# Patient Record
Sex: MALE | Race: BLACK OR AFRICAN AMERICAN | Employment: FULL TIME | URBAN - METROPOLITAN AREA
[De-identification: names, ages, dates, MRNs, and addresses within clinical notes are randomized per-mention and may not be internally consistent; named-entity substitution may affect disease eponyms.]

---

## 2020-08-26 ENCOUNTER — HOSPITAL ENCOUNTER (EMERGENCY)
Age: 25
Discharge: HOME | End: 2020-08-26
Attending: EMERGENCY MEDICINE
Payer: OTHER GOVERNMENT

## 2020-08-26 VITALS
HEART RATE: 79 BPM | TEMPERATURE: 97.8 F | SYSTOLIC BLOOD PRESSURE: 131 MMHG | WEIGHT: 160 LBS | DIASTOLIC BLOOD PRESSURE: 69 MMHG | RESPIRATION RATE: 18 BRPM | BODY MASS INDEX: 22.9 KG/M2 | OXYGEN SATURATION: 100 % | HEIGHT: 70 IN

## 2020-08-26 PROCEDURE — 99283 EMERGENCY DEPT VISIT LOW MDM: CPT

## 2020-08-26 PROCEDURE — 96375 TX/PRO/DX INJ NEW DRUG ADDON: CPT

## 2020-08-26 PROCEDURE — 96374 THER/PROPH/DIAG INJ IV PUSH: CPT

## 2020-08-26 PROCEDURE — 6360000002 HC RX W HCPCS: Performed by: EMERGENCY MEDICINE

## 2020-08-26 PROCEDURE — 2580000003 HC RX 258: Performed by: EMERGENCY MEDICINE

## 2020-08-26 PROCEDURE — 6370000000 HC RX 637 (ALT 250 FOR IP): Performed by: EMERGENCY MEDICINE

## 2020-08-26 RX ORDER — DIPHENHYDRAMINE HYDROCHLORIDE 50 MG/ML
25 INJECTION INTRAMUSCULAR; INTRAVENOUS ONCE
Status: COMPLETED | OUTPATIENT
Start: 2020-08-26 | End: 2020-08-26

## 2020-08-26 RX ORDER — METOCLOPRAMIDE HYDROCHLORIDE 5 MG/ML
10 INJECTION INTRAMUSCULAR; INTRAVENOUS ONCE
Status: COMPLETED | OUTPATIENT
Start: 2020-08-26 | End: 2020-08-26

## 2020-08-26 RX ORDER — ACETAMINOPHEN 325 MG/1
650 TABLET ORAL ONCE
Status: COMPLETED | OUTPATIENT
Start: 2020-08-26 | End: 2020-08-26

## 2020-08-26 RX ORDER — 0.9 % SODIUM CHLORIDE 0.9 %
1000 INTRAVENOUS SOLUTION INTRAVENOUS ONCE
Status: COMPLETED | OUTPATIENT
Start: 2020-08-26 | End: 2020-08-26

## 2020-08-26 RX ADMIN — METOCLOPRAMIDE 10 MG: 5 INJECTION, SOLUTION INTRAMUSCULAR; INTRAVENOUS at 10:44

## 2020-08-26 RX ADMIN — DIPHENHYDRAMINE HYDROCHLORIDE 25 MG: 50 INJECTION, SOLUTION INTRAMUSCULAR; INTRAVENOUS at 10:44

## 2020-08-26 RX ADMIN — SODIUM CHLORIDE 1000 ML: 9 INJECTION, SOLUTION INTRAVENOUS at 10:43

## 2020-08-26 RX ADMIN — ACETAMINOPHEN 650 MG: 325 TABLET ORAL at 10:43

## 2020-08-26 ASSESSMENT — ENCOUNTER SYMPTOMS
NAUSEA: 0
RHINORRHEA: 0
SHORTNESS OF BREATH: 0
PHOTOPHOBIA: 0
DIARRHEA: 0
WHEEZING: 0
VOMITING: 0
ABDOMINAL PAIN: 0
BACK PAIN: 0
COUGH: 0

## 2020-08-26 ASSESSMENT — PAIN DESCRIPTION - PAIN TYPE
TYPE: ACUTE PAIN
TYPE: ACUTE PAIN

## 2020-08-26 ASSESSMENT — PAIN DESCRIPTION - DESCRIPTORS: DESCRIPTORS: HEADACHE

## 2020-08-26 ASSESSMENT — PAIN DESCRIPTION - LOCATION: LOCATION: HEAD

## 2020-08-26 ASSESSMENT — PAIN SCALES - GENERAL
PAINLEVEL_OUTOF10: 7
PAINLEVEL_OUTOF10: 3

## 2020-08-26 NOTE — ED PROVIDER NOTES
Emergency Department Provider Note  Location: [facility]  ED  8/26/2020     Patient Identification  Reva Bradley is a 27 y.o. male    Chief Complaint  Migraine (hx of migraines, this started this morning around 0800, tried to take ibuprofen but threw it up, was able to hold down tylenol) and Nausea          HPI  (History provided by patient)  Patient is a generally healthy 82-year-old male who presents for intractable headache that started approximately 8 AM this morning. States he has a history of migraine headaches and this feels typical of a migraine headache. Described as a unilateral left-sided retro-orbital and superior aspect persistent pain. Does not radiate. Is not associated with any vision changes numbness weakness or paralysis. He has had one episode of emesis after he checked some water and try to take some ibuprofen. Taken partial dose of Tylenol prior to arrival.  No fevers no chills no neck pain or stiffness. I have reviewed the following nursing documentation:  Allergies: No Known Allergies    Past medical history:  has a past medical history of Heart murmur. Past surgical history:  has a past surgical history that includes Foot surgery (11/29/11) and Foot surgery (11/07/12). Home medications:   Prior to Admission medications    Not on File       Social history:  reports that he has never smoked. He has never used smokeless tobacco. He reports that he does not drink alcohol or use drugs. Family history:    Family History   Problem Relation Age of Onset    High Blood Pressure Mother     High Blood Pressure Maternal Grandmother     Stroke Maternal Grandmother     Allergies Brother     Diabetes Other     Cancer Other         Colon         ROS  Review of Systems   Constitutional: Negative for chills and fever. HENT: Negative for congestion and rhinorrhea. Eyes: Negative for photophobia and visual disturbance.    Respiratory: Negative for cough, shortness of breath and wheezing. Cardiovascular: Negative for chest pain and palpitations. Gastrointestinal: Negative for abdominal pain, diarrhea, nausea and vomiting. Genitourinary: Negative for dysuria and hematuria. Musculoskeletal: Negative for back pain and neck pain. Skin: Negative for rash and wound. Neurological: Positive for headaches. Negative for syncope and weakness. Psychiatric/Behavioral: Negative for agitation and confusion. Exam  ED Triage Vitals [08/26/20 1023]   BP Temp Temp Source Pulse Resp SpO2 Height Weight   133/79 97.8 °F (36.6 °C) Oral 61 14 100 % 5' 10\" (1.778 m) 160 lb (72.6 kg)       Physical Exam  Vitals signs and nursing note reviewed. Constitutional:       General: He is not in acute distress. Appearance: He is well-developed. HENT:      Head: Normocephalic and atraumatic. Nose: Nose normal. No congestion. Eyes:      Extraocular Movements: Extraocular movements intact. Pupils: Pupils are equal, round, and reactive to light. Neck:      Musculoskeletal: Normal range of motion and neck supple. Cardiovascular:      Rate and Rhythm: Normal rate and regular rhythm. Heart sounds: No murmur. Pulmonary:      Effort: Pulmonary effort is normal.      Breath sounds: Normal breath sounds. Abdominal:      General: There is no distension. Palpations: Abdomen is soft. Tenderness: There is no abdominal tenderness. Musculoskeletal: Normal range of motion. General: No deformity. Skin:     General: Skin is warm. Findings: No rash. Neurological:      Mental Status: He is alert and oriented to person, place, and time. Motor: No abnormal muscle tone. Coordination: Coordination normal.      Comments: No cranial nerve deficits appreciated, strength 5 out of 5 all extremities, sensation intact, no central ataxia, no cerebellar signs.    Psychiatric:         Mood and Affect: Mood normal.         Behavior: Behavior normal.           ED Course    ED Medication Orders (From admission, onward)    Start Ordered     Status Ordering Provider    08/26/20 1045 08/26/20 1031  0.9 % sodium chloride bolus  ONCE      Last MAR action:  Stopped - by Aleshia House on 08/26/20 at [facility]    08/26/20 1045 08/26/20 1031  metoclopramide (REGLAN) injection 10 mg  ONCE      Last MAR action:  Given - by Aleshia House on 08/26/20 at [address]    08/26/20 1045 08/26/20 1031  diphenhydrAMINE (BENADRYL) injection 25 mg  ONCE      Last MAR action:  Given - by Aleshia House on 08/26/20 at [address]    08/26/20 1045 08/26/20 1031  acetaminophen (TYLENOL) tablet 650 mg  ONCE      Last MAR action:  Given - by Aleshia House on 08/26/20 at [address] [facility], [id number] [address]              Radiology  No results found. Labs  No results found for this visit on 08/26/20. MDM  Generally healthy 82-year-old male who presents for intractable headache. On exam is well-appearing no acute distress reassuring vital signs. Reassuring exam no focal deficits. No red flag signs or symptoms. Consistent with his previous migraines. Treated with migraine cocktail with complete resolution of his symptoms. States he feels well enough to go home. Counseled on return precautions PCP follow-up and headache diary. Expresses understanding of the plan and is agreeable to the plan. Clinical Impression:  1. Intractable episodic headache, unspecified headache type          Disposition:  Discharge to home in good condition. Blood pressure 131/69, pulse 79, temperature 97.8 °F (36.6 °C), temperature source Oral, resp. rate 18, height 5' 10\" (1.778 m), weight 160 lb (72.6 kg), SpO2 100 %. Patient was given scripts for the following medications. I counseled patient how to take these medications. There are no discharge medications for this patient. Disposition referral (if applicable):   Ru Pino,

## 2020-08-26 NOTE — ED NOTES
Verbal and written discharge instructions given. IV removed. Patient in stable condition, discharged home.      Lety Malcolm RN  08/26/20 4771

## 2021-07-30 ENCOUNTER — OFFICE VISIT (OUTPATIENT)
Dept: FAMILY MEDICINE CLINIC | Age: 26
End: 2021-07-30
Payer: COMMERCIAL

## 2021-07-30 VITALS
HEART RATE: 76 BPM | SYSTOLIC BLOOD PRESSURE: 138 MMHG | HEIGHT: 70 IN | BODY MASS INDEX: 23.88 KG/M2 | OXYGEN SATURATION: 97 % | WEIGHT: 166.8 LBS | DIASTOLIC BLOOD PRESSURE: 86 MMHG

## 2021-07-30 DIAGNOSIS — Z00.00 ANNUAL PHYSICAL EXAM: Primary | ICD-10-CM

## 2021-07-30 PROCEDURE — 99385 PREV VISIT NEW AGE 18-39: CPT | Performed by: PHYSICIAN ASSISTANT

## 2021-07-30 ASSESSMENT — PATIENT HEALTH QUESTIONNAIRE - PHQ9
1. LITTLE INTEREST OR PLEASURE IN DOING THINGS: 0
SUM OF ALL RESPONSES TO PHQ QUESTIONS 1-9: 0
2. FEELING DOWN, DEPRESSED OR HOPELESS: 0
SUM OF ALL RESPONSES TO PHQ QUESTIONS 1-9: 0
SUM OF ALL RESPONSES TO PHQ QUESTIONS 1-9: 0
SUM OF ALL RESPONSES TO PHQ9 QUESTIONS 1 & 2: 0

## 2021-07-30 NOTE — PROGRESS NOTES
History and Physical      Jada Sosa  YOB: 1995    Date of Service:  7/30/2021    Chief Complaint:   Jada Sosa is a 23 y.o. male who presents for complete physical examination. HPI: Overall feeling well. Just moved back from [address] [place] [address], was in the [employer] for 4 years, back to [place] last August. Working at [employer]. Wt Readings from Last 3 Encounters:   07/30/21 166 lb 12.8 oz (75.7 kg)   08/26/20 160 lb (72.6 kg)   01/09/15 152 lb 6.4 oz (69.1 kg) (64 %, Z= 0.37)*     * Growth percentiles are based on CDC (Boys, 2-20 Years) data.      BP Readings from Last 3 Encounters:   07/30/21 138/86   08/26/20 131/69   01/09/15 114/76       Patient Active Problem List   Diagnosis    Rib pain on right side    Rib fracture    Chest wall contusion    Intercostal muscle strain       Preventive Care:  Health Maintenance   Topic Date Due    Hepatitis C screen  Never done    Varicella vaccine (2 of 2 - 2-dose childhood series) 11/24/2001    HPV vaccine (1 - Male 2-dose series) Never done    COVID-19 Vaccine (1) Never done    HIV screen  Never done    Hepatitis A vaccine (2 of 2 - 2-dose series) 10/13/2017    Flu vaccine (1) 09/01/2021    DTaP/Tdap/Td vaccine (5 - Td or Tdap) 09/09/2026    Hepatitis B vaccine  Completed    Hib vaccine  Completed    Meningococcal (ACWY) vaccine  Completed    Pneumococcal 0-64 years Vaccine  Aged Out     Self-testicular exams: Yes  Sexual activity: none   Last eye exam: a year ago  Exercise: yes  Seatbelt use: yes      Immunization History   Administered Date(s) Administered    DTaP 01/24/1998, 03/24/1998, 05/26/1998, 06/01/1999, 05/28/2003    Hepatitis B 1995, 1997, 08/24/1998    Hib, unspecified 01/24/1998, 03/24/1998, 05/26/1998, 06/01/1999    Influenza 11/22/2011    MMR 03/17/1999, 05/28/2003, 03/26/2019    Polio IPV (IPOL) 01/24/1998, 03/24/1998, 06/01/1999, 05/28/2003    Tdap (Boostrix, Adacel) 06/11/2010    Varicella (Varivax) 11/24/1998       No Known Allergies  No current outpatient medications on file. No current facility-administered medications for this visit. Past Medical History:   Diagnosis Date    Heart murmur      Past Surgical History:   Procedure Laterality Date    FOOT SURGERY  11/29/11    subtalar arthroresis with HANNAH implant left foot.  FOOT SURGERY  11/07/12    rt subtolar arthrodesis     Family History   Problem Relation Age of Onset    High Blood Pressure Mother     High Blood Pressure Maternal Grandmother     Stroke Maternal Grandmother     Allergies Brother     No Known Problems Father     Diabetes Other     Cancer Other         Colon     Social History     Socioeconomic History    Marital status: Single     Spouse name: Not on file    Number of children: Not on file    Years of education: Not on file    Highest education level: Not on file   Occupational History    Not on file   Tobacco Use    Smoking status: Never Smoker    Smokeless tobacco: Never Used    Tobacco comment: Mother is a smoker   Vaping Use    Vaping Use: Never used   Substance and Sexual Activity    Alcohol use: No    Drug use: No    Sexual activity: Not on file   Other Topics Concern    Not on file   Social History Narrative    Not on file     Social Determinants of Health     Financial Resource Strain:     Difficulty of Paying Living Expenses:    Food Insecurity:     Worried About Running Out of Food in the Last Year:     Ran Out of Food in the Last Year:    Transportation Needs:     Lack of Transportation (Medical):      Lack of Transportation (Non-Medical):    Physical Activity:     Days of Exercise per Week:     Minutes of Exercise per Session:    Stress:     Feeling of Stress :    Social Connections:     Frequency of Communication with Friends and Family:     Frequency of Social Gatherings with Friends and Family:     Attends Alevism Services:     Active Member of Clubs or Organizations:  Attends Club or Organization Meetings:     Marital Status:    Intimate Partner Violence:     Fear of Current or Ex-Partner:     Emotionally Abused:     Physically Abused:     Sexually Abused:        Review of Systems:  A comprehensive review of systems was negative except for what was noted in the HPI. Physical Exam:   Vitals:    07/30/21 1504   BP: 138/86   Site: Right Upper Arm   Position: Sitting   Pulse: 76   SpO2: 97%   Weight: 166 lb 12.8 oz (75.7 kg)   Height: 5' 10\" (1.778 m)     Body mass index is 23.93 kg/m². Constitutional: He is oriented to person, place, and time. He appears well-developed and well-nourished. No distress. HEENT:   Head: Normocephalic and atraumatic. Right Ear: Tympanic membrane, external ear and ear canal normal.   Left Ear: Tympanic membrane, external ear and ear canal normal.   Nose: Nose normal.   Mouth/Throat: Oropharynx is clear and moist and mucous membranes are normal. No oropharyngeal exudate or posterior oropharyngeal erythema. He has no cervical adenopathy. Eyes: Conjunctivae and extraocular motions are normal. Pupils are equal, round, and reactive to light. Neck: Full passive range of motion without pain. Neck supple. No JVD present. Carotid bruit is not present. No mass and no thyromegaly present. Cardiovascular: Normal rate, regular rhythm, normal heart sounds and intact distal pulses. Exam reveals no gallop and no friction rub. No murmur heard. Pulmonary/Chest: Effort normal and breath sounds normal. No respiratory distress. He has no wheezes, rhonchi or rales. Abdominal: Soft, non-tender. Bowel sounds and aorta are normal. There is no organomegaly, mass or bruit. Musculoskeletal: Normal range of motion, no synovitis. He exhibits no edema. Neurological: He is alert and oriented to person, place, and time. He has normal reflexes. No cranial nerve deficit. Coordination normal.   Skin: Skin is warm, dry and intact.   No suspicious lesions are

## 2021-08-23 ENCOUNTER — OFFICE VISIT (OUTPATIENT)
Dept: FAMILY MEDICINE CLINIC | Age: 26
End: 2021-08-23
Payer: COMMERCIAL

## 2021-08-23 VITALS
DIASTOLIC BLOOD PRESSURE: 86 MMHG | BODY MASS INDEX: 24.31 KG/M2 | OXYGEN SATURATION: 99 % | SYSTOLIC BLOOD PRESSURE: 126 MMHG | HEIGHT: 70 IN | WEIGHT: 169.8 LBS | HEART RATE: 62 BPM

## 2021-08-23 DIAGNOSIS — L08.9 INFECTED SEBACEOUS CYST: Primary | ICD-10-CM

## 2021-08-23 DIAGNOSIS — L72.3 INFECTED SEBACEOUS CYST: Primary | ICD-10-CM

## 2021-08-23 PROCEDURE — 99213 OFFICE O/P EST LOW 20 MIN: CPT | Performed by: PHYSICIAN ASSISTANT

## 2021-08-23 RX ORDER — CEPHALEXIN 500 MG/1
500 CAPSULE ORAL 4 TIMES DAILY
Qty: 40 CAPSULE | Refills: 0 | Status: SHIPPED | OUTPATIENT
Start: 2021-08-23 | End: 2021-09-03 | Stop reason: ALTCHOICE

## 2021-08-23 ASSESSMENT — ENCOUNTER SYMPTOMS: RESPIRATORY NEGATIVE: 1

## 2021-09-03 ENCOUNTER — NURSE TRIAGE (OUTPATIENT)
Dept: OTHER | Facility: CLINIC | Age: 26
End: 2021-09-03

## 2021-09-03 ENCOUNTER — OFFICE VISIT (OUTPATIENT)
Dept: FAMILY MEDICINE CLINIC | Age: 26
End: 2021-09-03
Payer: COMMERCIAL

## 2021-09-03 VITALS
HEIGHT: 70 IN | HEART RATE: 75 BPM | DIASTOLIC BLOOD PRESSURE: 80 MMHG | SYSTOLIC BLOOD PRESSURE: 120 MMHG | OXYGEN SATURATION: 97 % | BODY MASS INDEX: 23.74 KG/M2 | WEIGHT: 165.8 LBS

## 2021-09-03 DIAGNOSIS — R59.1 LYMPHADENOPATHY: Primary | ICD-10-CM

## 2021-09-03 PROCEDURE — 99213 OFFICE O/P EST LOW 20 MIN: CPT | Performed by: PHYSICIAN ASSISTANT

## 2021-09-03 ASSESSMENT — ENCOUNTER SYMPTOMS: RESPIRATORY NEGATIVE: 1

## 2021-09-03 NOTE — TELEPHONE ENCOUNTER
Reason for Disposition   Very tender to the touch but no fever    Answer Assessment - Initial Assessment Questions  1. LOCATION: \"Where is the swollen node located? \" \"Is the matching node on the other side of the body also swollen? \"       Neck- left side and back of neck    2. SIZE: \"How big is the node? \" (Inches or centimeters) (or compare to common objects such as pea, bean, marble, golf ball)       Marbles    3. ONSET: \"When did the swelling start? \"       Since at least 8/23, got worse after visit    4. NECK NODES: \"Is there a sore throat, runny nose or other symptoms of a cold? \"       No    5. GROIN OR ARMPIT NODES: \"Is there a sore, scratch, cut or painful red area on that arm or leg? \"       Had a cyst on back of neck/head    6. FEVER: \"Do you have a fever? \" If so, ask: \"What is it, how was it measured, and when did it start? \"       No    7. CAUSE: \"What do you think is causing the swollen lymph nodes? \"      Recently treated for cyst    8. OTHER SYMPTOMS: \"Do you have any other symptoms? \"      Tender to touch and hurts all the time, especially when moving head    9. PREGNANCY: \"Is there any chance you are pregnant? \" \"When was your last menstrual period? \"      N/a    Protocols used: LYMPH NODES - SWOLLEN-ADULT-OH    Received call from Kendrick Garcia at [facility] with Red Flag Complaint. Brief description of triage: swollen lymph nodes on left side and back of neck persisting     Triage indicates for patient to be seen today or tomorrow in office    Care advice provided, patient verbalizes understanding; denies any other questions or concerns; instructed to call back for any new or worsening symptoms. Writer provided warm transfer to [facility] at [facility] for appointment scheduling. Attention Provider: Thank you for allowing me to participate in the care of your patient. The patient was connected to triage in response to information provided to the [facility].   Please do not respond through this encounter as the response is not directed to a shared pool.

## 2021-09-03 NOTE — LETTER
[address] [place] [address] [address] [address]  Phone: [phone]  Fax: [phone]    Dawn Bautista        September 3, 2021     Patient: Marcia Browning   YOB: 1995   Date of Visit: 9/3/2021       To Whom it May Concern:    Adilene Lewis was seen in my clinic on 9/3/2021. He may return to work on 9/4/21. Please excuse due to illness 8/30/21- 9/3/21. If you have any questions or concerns, please don't hesitate to call.     Sincerely,           DEBBIE Bautista

## 2022-01-04 ENCOUNTER — TELEPHONE (OUTPATIENT)
Dept: FAMILY MEDICINE CLINIC | Age: 27
End: 2022-01-04

## 2022-01-04 NOTE — TELEPHONE ENCOUNTER
----- Message from Vitaly Rendon sent at 1/4/2022 11:52 AM EST -----  Subject: Message to Provider    QUESTIONS  Information for Provider? pt rapid covid test positive. wanting to know if   can speak with provider regarding next steps   ---------------------------------------------------------------------------  --------------  [address]  What is the best way for the office to contact you? OK to leave message on   voicemail  Preferred Call Back Phone Number? [phone]  ---------------------------------------------------------------------------  --------------  SCRIPT ANSWERS  Relationship to Patient? Other  Representative Name? Tara Pineda  Is the Representative on the appropriate HIPAA document in Epic?  Yes

## 2022-01-05 ENCOUNTER — VIRTUAL VISIT (OUTPATIENT)
Dept: FAMILY MEDICINE CLINIC | Age: 27
End: 2022-01-05
Payer: COMMERCIAL

## 2022-01-05 DIAGNOSIS — U07.1 COVID: Primary | ICD-10-CM

## 2022-01-05 PROCEDURE — 99213 OFFICE O/P EST LOW 20 MIN: CPT | Performed by: PHYSICIAN ASSISTANT

## 2022-01-05 NOTE — PROGRESS NOTES
[address] [facility]    22     TELEHEALTH EVALUATION -- Audio/Visual (During [facility]- public health emergency)  Chief Complaint   Patient presents with    Positive For Covid-19     Fever of 102, tested positive yesterday with an at home test    Fever    Fatigue    Generalized Body Aches       HPI:    Camilla Cheney (:  1995) has requested an audio/video evaluation for the following concern(s):  Has COVID, symptoms began  A few days ago  , confirmed yesterday . With  Headache, Fever Tmax 102F, myalgias/arthralgias and nasal congestion. Remedies  NSAID'S. ROS: Denies loss of taste or smell, purulent nasal discharge, shortness of breath, wheezing/chest tightness, purulent sputum, nausea/vomiting and diarrhea. Prior to Visit Medications    Not on File     No Known Allergies  MED-SURG- SOC  HISTORY- in chart and reviewed. PHYSICAL EXAMINATION:    Patient-Reported Vitals 2022   Patient-Reported Weight 165lb   Patient-Reported Height 5'10         Constitutional: [x] Appears well-developed and well-nourished [x] No apparent distress      [] Abnormal- Looks sick, non toxic    Mental status  [x] Alert and awake  [x] Oriented to person/place/time [x]Able to follow commands      Eyes:  EOM    [x]  Normal  [] Abnormal-  Sclera  [x]  Normal  [] Abnormal -         Discharge [x]  None visible  [] Abnormal -    HENT:   [x] Normocephalic, atraumatic.   [] Abnormal   [x] Mouth/Throat: Mucous membranes are moist.     External Ears [x] Normal  [] Abnormal-     Neck: [x] No visualized mass     Pulmonary/Chest: [x] Respiratory effort normal.  [x] No visualized signs of difficulty breathing or respiratory distress    [] Abnormal-      Musculoskeletal:   [] Normal gait with no signs of ataxia         [x] Normal range of motion of neck   [] Abnormal-       Neurological:        [x] No Facial Asymmetry (Cranial nerve 7 motor function) (limited exam to video visit)          [x] No gaze palsy   [] Abnormal-         Skin:        [x] No significant exanthematous lesions or discoloration noted on facial skin    [] Abnormal-            Psychiatric:       [x] Normal Affect [x] No Hallucinations    [] Abnormal-   Other pertinent observable physical exam findings-     ASSESSMENT/PLAN:  1. COVID 1/4/2022         - Discussed CDC guidelines for quarantine. Anyone exposed to you must quarantine minimum of 5 days, if still symptomatic 7 days and wear mask around others for 14 days no matter what. - Symptom relief with OTC meds. Add daily Airborne and/or MVI. - Rest, increase fluids. - If worse, call clinic or go to ER if dyspnea  becomes severe. Sonja Cavanaugh is a 27 y.o. male being evaluated by a Virtual Visit (video visit) encounter to address concerns as mentioned above. A caregiver was present when appropriate. Due to this being a TeleHealth encounter (During WTDTD-46 public health emergency), evaluation of the following organ systems was limited: Vitals/Constitutional/EENT/Resp/CV/GI//MS/Neuro/Skin/Heme-Lymph-Imm. Pursuant to the emergency declaration under the [address] [place] [address], [address] [place] [address] authority and the [employer] and [employer] Act, this Virtual Visit was conducted with patient's (and/or legal guardian's) consent, to reduce the patient's risk of exposure to COVID-19 and provide necessary medical care. The patient (and/or legal guardian) has also been advised to contact this office for worsening conditions or problems, and seek emergency medical treatment and/or call 911 if deemed necessary. Patient identification was verified at the start of the visit: Yes  Total time spent on this encounter: Not billed by time  Services were provided through a video synchronous discussion virtually to substitute for in-person clinic visit. Patient and provider were located at their individual homes.     --Vonne Sender [place], [place] on 1/5/2022 at 1:36 PM    An electronic signature was used to authenticate this note.

## 2022-01-06 ENCOUNTER — PATIENT MESSAGE (OUTPATIENT)
Dept: FAMILY MEDICINE CLINIC | Age: 27
End: 2022-01-06

## 2022-01-06 NOTE — LETTER
[address] [place] [address] [address] [address]  Phone: [phone]  Fax: [phone]    Dawn Groves        January 6, 2022     Patient: Cory Mehta   YOB: 1995   Date of Visit: 1/6/2022       To Whom it May Concern:    Dean Perez was seen in my clinic on 1/5/2022 diagnosed with COVID 19. CDC guidelines for quarantine require must isolate a minimum of 5 days or 7 days if still  Symptomatic. In addition a mask must be worn around others for 14 days no matter what. If you have any questions or concerns, please don't hesitate to call.     Sincerely,         Dawn Groves

## 2022-01-06 NOTE — TELEPHONE ENCOUNTER
From: Roxann Bermudez  To: Ra Mckeon  Sent: 1/6/2022 3:02 PM EST  Subject: Collins Blake    I need a doctor's note for my job. They are asking me to come in to work and  equipment to work from home. I told them I am quarantined for 5 days. Can you send me an email or put the note in a message in my chart. If possible I need it today. Thank you.    David Dior

## 2023-12-09 ASSESSMENT — PATIENT HEALTH QUESTIONNAIRE - PHQ9
SUM OF ALL RESPONSES TO PHQ QUESTIONS 1-9: 2
SUM OF ALL RESPONSES TO PHQ QUESTIONS 1-9: 2
SUM OF ALL RESPONSES TO PHQ9 QUESTIONS 1 & 2: 2
2. FEELING DOWN, DEPRESSED OR HOPELESS: SEVERAL DAYS
1. LITTLE INTEREST OR PLEASURE IN DOING THINGS: 1
SUM OF ALL RESPONSES TO PHQ QUESTIONS 1-9: 2
SUM OF ALL RESPONSES TO PHQ QUESTIONS 1-9: 2
2. FEELING DOWN, DEPRESSED OR HOPELESS: 1
1. LITTLE INTEREST OR PLEASURE IN DOING THINGS: SEVERAL DAYS
SUM OF ALL RESPONSES TO PHQ9 QUESTIONS 1 & 2: 2

## 2023-12-11 ENCOUNTER — OFFICE VISIT (OUTPATIENT)
Dept: FAMILY MEDICINE CLINIC | Age: 28
End: 2023-12-11
Payer: COMMERCIAL

## 2023-12-11 VITALS
HEIGHT: 70 IN | DIASTOLIC BLOOD PRESSURE: 70 MMHG | OXYGEN SATURATION: 98 % | BODY MASS INDEX: 22.62 KG/M2 | SYSTOLIC BLOOD PRESSURE: 114 MMHG | HEART RATE: 66 BPM | RESPIRATION RATE: 16 BRPM | WEIGHT: 158 LBS

## 2023-12-11 DIAGNOSIS — Z00.00 ANNUAL PHYSICAL EXAM: Primary | ICD-10-CM

## 2023-12-11 LAB
ALBUMIN SERPL-MCNC: 5.1 G/DL (ref 3.4–5)
ALBUMIN/GLOB SERPL: 1.8 {RATIO} (ref 1.1–2.2)
ALP SERPL-CCNC: 53 U/L (ref 40–129)
ALT SERPL-CCNC: 28 U/L (ref 10–40)
ANION GAP SERPL CALCULATED.3IONS-SCNC: 10 MMOL/L (ref 3–16)
AST SERPL-CCNC: 21 U/L (ref 15–37)
BILIRUB SERPL-MCNC: 0.8 MG/DL (ref 0–1)
BUN SERPL-MCNC: 16 MG/DL (ref 7–20)
CALCIUM SERPL-MCNC: 10.1 MG/DL (ref 8.3–10.6)
CHLORIDE SERPL-SCNC: 99 MMOL/L (ref 99–110)
CHOLEST SERPL-MCNC: 148 MG/DL (ref 0–199)
CO2 SERPL-SCNC: 28 MMOL/L (ref 21–32)
CREAT SERPL-MCNC: 1.1 MG/DL (ref 0.9–1.3)
DEPRECATED RDW RBC AUTO: 13.3 % (ref 12.4–15.4)
GFR SERPLBLD CREATININE-BSD FMLA CKD-EPI: >60 ML/MIN/{1.73_M2}
GLUCOSE SERPL-MCNC: 81 MG/DL (ref 70–99)
HCT VFR BLD AUTO: 43.9 % (ref 40.5–52.5)
HCV AB SERPL QL IA: NORMAL
HDLC SERPL-MCNC: 50 MG/DL (ref 40–60)
HGB BLD-MCNC: 15 G/DL (ref 13.5–17.5)
LDLC SERPL CALC-MCNC: 86 MG/DL
MCH RBC QN AUTO: 31.4 PG (ref 26–34)
MCHC RBC AUTO-ENTMCNC: 34.2 G/DL (ref 31–36)
MCV RBC AUTO: 91.6 FL (ref 80–100)
PLATELET # BLD AUTO: 194 K/UL (ref 135–450)
PLATELET BLD QL SMEAR: ADEQUATE
PMV BLD AUTO: 9.3 FL (ref 5–10.5)
POTASSIUM SERPL-SCNC: 4.5 MMOL/L (ref 3.5–5.1)
PROT SERPL-MCNC: 8 G/DL (ref 6.4–8.2)
RBC # BLD AUTO: 4.79 M/UL (ref 4.2–5.9)
SLIDE REVIEW: NORMAL
SODIUM SERPL-SCNC: 137 MMOL/L (ref 136–145)
TRIGL SERPL-MCNC: 62 MG/DL (ref 0–150)
VLDLC SERPL CALC-MCNC: 12 MG/DL
WBC # BLD AUTO: 4.8 K/UL (ref 4–11)

## 2023-12-11 PROCEDURE — 36415 COLL VENOUS BLD VENIPUNCTURE: CPT | Performed by: PHYSICIAN ASSISTANT

## 2023-12-11 PROCEDURE — 99395 PREV VISIT EST AGE 18-39: CPT | Performed by: PHYSICIAN ASSISTANT

## 2023-12-11 SDOH — ECONOMIC STABILITY: FOOD INSECURITY: WITHIN THE PAST 12 MONTHS, THE FOOD YOU BOUGHT JUST DIDN'T LAST AND YOU DIDN'T HAVE MONEY TO GET MORE.: NEVER TRUE

## 2023-12-11 SDOH — ECONOMIC STABILITY: FOOD INSECURITY: WITHIN THE PAST 12 MONTHS, YOU WORRIED THAT YOUR FOOD WOULD RUN OUT BEFORE YOU GOT MONEY TO BUY MORE.: NEVER TRUE

## 2023-12-11 SDOH — ECONOMIC STABILITY: INCOME INSECURITY: HOW HARD IS IT FOR YOU TO PAY FOR THE VERY BASICS LIKE FOOD, HOUSING, MEDICAL CARE, AND HEATING?: NOT HARD AT ALL

## 2023-12-11 SDOH — ECONOMIC STABILITY: HOUSING INSECURITY
IN THE LAST 12 MONTHS, WAS THERE A TIME WHEN YOU DID NOT HAVE A STEADY PLACE TO SLEEP OR SLEPT IN A SHELTER (INCLUDING NOW)?: NO

## 2023-12-11 NOTE — PROGRESS NOTES
History and Physical      Sarmad Becker  YOB: 1995    Date of Service:  12/11/2023    Chief Complaint:   Sarmad Becker is a 34 y.o. male who presents for complete physical examination. HPI: Overall feeling well. Wt Readings from Last 3 Encounters:   12/11/23 71.7 kg (158 lb)   09/03/21 75.2 kg (165 lb 12.8 oz)   08/23/21 77 kg (169 lb 12.8 oz)     BP Readings from Last 3 Encounters:   12/11/23 114/70   09/03/21 120/80   08/23/21 126/86       Patient Active Problem List   Diagnosis   (none) - all problems resolved or deleted           PREVENTIVE HEALTH:   Last eye exam:   no vision concerns  Hearing concerns: No- intermittent tinnitus  Last Dental exam:     past due  Caffeine use:  1/ day  Exercise:  yes  Diet: feels overall healthy  Alcohol use: 0/ week  Tobacco/ Vapping/ marijuana use:  no  Drug use: no  Mental Health; concerns of anxiety or depression?  no.  PHQ-9 Total Score: 2 (12/9/2023  3:29 PM)     Perform routine skin checks?  No    Immunization History   Administered Date(s) Administered    Adenovirus, Type 4 And 7, Live, Oral (Admin As 2 Tab) 09/09/2016    Anthrax (BioThrax) 09/11/2018    DTaP 01/24/1998, 03/24/1998, 05/26/1998, 06/01/1999, 05/28/2003    Hep A, HAVRIX, VAQTA, (age 17m-24y), IM, 0.5mL 10/14/2016, 04/13/2017    Hepatitis B [birth year], 08/24/1998    Hib, unspecified 01/24/1998, 03/24/1998, 05/26/1998, 06/01/1999    Influenza 11/22/2011    Influenza Virus Vaccine 11/22/2013, 10/14/2016, 10/17/2018, 10/01/2019    Influenza, FLUCELVAX, (age 10 mo+), MDCK, MDV, 0.5mL 09/29/2017    MMR, Fredo Can M-M-R II, (age 12m+), SC, 0.5mL 03/17/1999, 05/28/2003, 03/26/2019    Meningococcal ACWY, MENVEO (MenACWY-CRM), (age 3m-50y), IM, 0.5mL 09/09/2016    PPD Test 09/09/2016    Poliovirus, IPOL, (age 6w+), SC/IM, 0.5mL 01/24/1998, 03/24/1998, 06/01/1999, 05/28/2003, 10/14/2016    TDaP, ADACEL (age 6y-58y), BOOSTRIX (age 10y+), IM, 0.5mL 06/11/2010, 09/09/2016    Typhoid

## 2024-02-05 ASSESSMENT — PATIENT HEALTH QUESTIONNAIRE - PHQ9
SUM OF ALL RESPONSES TO PHQ QUESTIONS 1-9: 0
1. LITTLE INTEREST OR PLEASURE IN DOING THINGS: NOT AT ALL
SUM OF ALL RESPONSES TO PHQ9 QUESTIONS 1 & 2: 0
1. LITTLE INTEREST OR PLEASURE IN DOING THINGS: 0
2. FEELING DOWN, DEPRESSED OR HOPELESS: 0
SUM OF ALL RESPONSES TO PHQ QUESTIONS 1-9: 0
2. FEELING DOWN, DEPRESSED OR HOPELESS: NOT AT ALL
SUM OF ALL RESPONSES TO PHQ QUESTIONS 1-9: 0
SUM OF ALL RESPONSES TO PHQ QUESTIONS 1-9: 0
SUM OF ALL RESPONSES TO PHQ9 QUESTIONS 1 & 2: 0

## 2024-02-06 ENCOUNTER — OFFICE VISIT (OUTPATIENT)
Dept: FAMILY MEDICINE CLINIC | Age: 29
End: 2024-02-06
Payer: COMMERCIAL

## 2024-02-06 VITALS
OXYGEN SATURATION: 99 % | WEIGHT: 160.6 LBS | BODY MASS INDEX: 22.99 KG/M2 | DIASTOLIC BLOOD PRESSURE: 76 MMHG | SYSTOLIC BLOOD PRESSURE: 128 MMHG | RESPIRATION RATE: 18 BRPM | HEART RATE: 63 BPM | HEIGHT: 70 IN

## 2024-02-06 DIAGNOSIS — Z11.3 SCREENING EXAMINATION FOR STD (SEXUALLY TRANSMITTED DISEASE): Primary | ICD-10-CM

## 2024-02-06 PROCEDURE — 99213 OFFICE O/P EST LOW 20 MIN: CPT | Performed by: PHYSICIAN ASSISTANT

## 2024-02-06 ASSESSMENT — ENCOUNTER SYMPTOMS: RESPIRATORY NEGATIVE: 1

## 2024-02-06 NOTE — PROGRESS NOTES
Subjective:      Patient ID: Shayne Austin is a 28 y.o. male.      Patient presents today for STD screening. He denies any dysuria or penile discharge.   Is not currently sexually active but has been in the past in late teens early 20's. Is in a serious relationship now but wants screened prior to check prior to becoming sexually active. Denies every having any signs of herpes.     Review of Systems   Constitutional: Negative.    Respiratory: Negative.     Cardiovascular: Negative.        Objective:   Physical Exam  Constitutional:       Appearance: Normal appearance.   Pulmonary:      Effort: Pulmonary effort is normal.   Genitourinary:     Penis: Normal.       Testes: Normal.   Neurological:      General: No focal deficit present.      Mental Status: He is alert and oriented to person, place, and time.         Assessment / Plan:            Diagnosis Orders   1. Screening examination for STD (sexually transmitted disease)  C.trachomatis N.gonorrhoeae DNA, Urine ([place] Only)    HIV Screen    RPR TITER

## 2024-02-07 LAB
HIV 1+2 AB+HIV1 P24 AG SERPL QL IA: NORMAL
HIV 2 AB SERPL QL IA: NORMAL
HIV1 AB SERPL QL IA: NORMAL
HIV1 P24 AG SERPL QL IA: NORMAL
REAGIN+T PALLIDUM IGG+IGM SERPL-IMP: NORMAL

## 2024-02-08 LAB
C TRACH DNA UR QL NAA+PROBE: NEGATIVE
N GONORRHOEA DNA UR QL NAA+PROBE: NEGATIVE